# Patient Record
Sex: MALE | Race: WHITE | Employment: OTHER | URBAN - METROPOLITAN AREA
[De-identification: names, ages, dates, MRNs, and addresses within clinical notes are randomized per-mention and may not be internally consistent; named-entity substitution may affect disease eponyms.]

---

## 2018-05-01 ENCOUNTER — HOSPITAL ENCOUNTER (EMERGENCY)
Facility: CLINIC | Age: 78
Discharge: HOME | End: 2018-05-01
Attending: EMERGENCY MEDICINE
Payer: MEDICARE

## 2018-05-01 VITALS
HEART RATE: 61 BPM | SYSTOLIC BLOOD PRESSURE: 150 MMHG | WEIGHT: 160 LBS | TEMPERATURE: 97.5 F | OXYGEN SATURATION: 98 % | RESPIRATION RATE: 18 BRPM | DIASTOLIC BLOOD PRESSURE: 67 MMHG | BODY MASS INDEX: 25.11 KG/M2 | HEIGHT: 67 IN

## 2018-05-01 DIAGNOSIS — S61.259A DOG BITE OF FINGER, INITIAL ENCOUNTER: ICD-10-CM
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
## 2018-05-01 DIAGNOSIS — S81.851A DOG BITE OF RIGHT LOWER LEG, INITIAL ENCOUNTER: Primary | ICD-10-CM
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
## 2018-05-01 PROCEDURE — 12002 RPR S/N/AX/GEN/TRNK2.6-7.5CM: CPT

## 2018-05-01 PROCEDURE — 90715 TDAP VACCINE 7 YRS/> IM: CPT | Performed by: EMERGENCY MEDICINE

## 2018-05-01 PROCEDURE — 6360000002 HC RX W HCPCS: Performed by: EMERGENCY MEDICINE

## 2018-05-01 PROCEDURE — 99283 EMERGENCY DEPT VISIT LOW MDM: CPT

## 2018-05-01 PROCEDURE — 90471 IMMUNIZATION ADMIN: CPT | Performed by: EMERGENCY MEDICINE

## 2018-05-01 PROCEDURE — 6370000000 HC RX 637 (ALT 250 FOR IP): Performed by: EMERGENCY MEDICINE

## 2018-05-01 PROCEDURE — 2500000003 HC RX 250 WO HCPCS: Performed by: EMERGENCY MEDICINE

## 2018-05-01 RX ORDER — AMOXICILLIN AND CLAVULANATE POTASSIUM 875; 125 MG/1; MG/1
1 TABLET, FILM COATED ORAL 2 TIMES DAILY
Qty: 20 TABLET | Refills: 0 | Status: SHIPPED | OUTPATIENT
Start: 2018-05-01 | End: 2018-05-11

## 2018-05-01 RX ORDER — LIDOCAINE HYDROCHLORIDE 10 MG/ML
20 INJECTION, SOLUTION EPIDURAL; INFILTRATION; INTRACAUDAL; PERINEURAL ONCE
Status: COMPLETED | OUTPATIENT
Start: 2018-05-01 | End: 2018-05-01

## 2018-05-01 RX ORDER — LIDOCAINE HYDROCHLORIDE 10 MG/ML
10 INJECTION, SOLUTION EPIDURAL; INFILTRATION; INTRACAUDAL; PERINEURAL ONCE
Status: DISCONTINUED | OUTPATIENT
Start: 2018-05-01 | End: 2018-05-01

## 2018-05-01 RX ORDER — LIDOCAINE HYDROCHLORIDE 10 MG/ML
INJECTION, SOLUTION INFILTRATION; PERINEURAL
Status: DISCONTINUED
Start: 2018-05-01 | End: 2018-05-01 | Stop reason: HOSPADM

## 2018-05-01 RX ORDER — GINSENG 100 MG
CAPSULE ORAL ONCE
Status: COMPLETED | OUTPATIENT
Start: 2018-05-01 | End: 2018-05-01

## 2018-05-01 RX ORDER — GABAPENTIN 100 MG/1
100 CAPSULE ORAL 2 TIMES DAILY
COMMUNITY

## 2018-05-01 RX ADMIN — TETANUS TOXOID, REDUCED DIPHTHERIA TOXOID AND ACELLULAR PERTUSSIS VACCINE, ADSORBED 0.5 ML: 5; 2.5; 8; 8; 2.5 SUSPENSION INTRAMUSCULAR at 12:04

## 2018-05-01 RX ADMIN — BACITRACIN: 500 OINTMENT TOPICAL at 13:20

## 2018-05-01 RX ADMIN — LIDOCAINE HYDROCHLORIDE 20 ML: 10 INJECTION, SOLUTION EPIDURAL; INFILTRATION; INTRACAUDAL; PERINEURAL at 12:51

## 2018-05-01 ASSESSMENT — PAIN SCALES - GENERAL: PAINLEVEL_OUTOF10: 5

## 2018-05-01 ASSESSMENT — PAIN DESCRIPTION - PAIN TYPE: TYPE: ACUTE PAIN

## 2019-08-29 PROBLEM — S81.011A LACERATION OF RIGHT KNEE: Status: ACTIVE | Noted: 2018-05-21

## 2019-08-29 PROBLEM — I65.23 BILATERAL CAROTID ARTERY STENOSIS: Status: ACTIVE | Noted: 2017-11-06

## 2019-08-29 PROBLEM — R39.15 URINARY URGENCY: Status: ACTIVE | Noted: 2018-09-26

## 2019-08-29 PROBLEM — R60.0 BILATERAL EDEMA OF LOWER EXTREMITY: Status: ACTIVE | Noted: 2017-07-18

## 2019-08-29 PROBLEM — R00.1 SINUS BRADYCARDIA: Status: ACTIVE | Noted: 2017-10-30

## 2019-08-29 PROBLEM — N40.1 BENIGN NON-NODULAR PROSTATIC HYPERPLASIA WITH LOWER URINARY TRACT SYMPTOMS: Status: ACTIVE | Noted: 2017-09-26

## 2019-08-29 PROBLEM — Z95.2 H/O AORTIC VALVE REPLACEMENT: Status: ACTIVE | Noted: 2019-08-16

## 2019-08-29 PROBLEM — T14.8XXA OTHER INJURY OF UNSPECIFIED BODY REGION, INITIAL ENCOUNTER: Status: ACTIVE | Noted: 2019-08-16

## 2019-08-29 PROBLEM — I73.9 VASCULAR DISEASE, PERIPHERAL (HCC): Status: ACTIVE | Noted: 2017-04-04

## 2019-08-29 PROBLEM — I73.9 CLAUDICATION (HCC): Status: ACTIVE | Noted: 2017-11-06

## 2019-09-04 ENCOUNTER — OFFICE VISIT (OUTPATIENT)
Dept: INFECTIOUS DISEASES | Age: 79
End: 2019-09-04
Payer: MEDICARE

## 2019-09-04 VITALS
RESPIRATION RATE: 16 BRPM | HEART RATE: 75 BPM | WEIGHT: 148.4 LBS | HEIGHT: 67 IN | OXYGEN SATURATION: 97 % | SYSTOLIC BLOOD PRESSURE: 130 MMHG | TEMPERATURE: 97.6 F | DIASTOLIC BLOOD PRESSURE: 74 MMHG | BODY MASS INDEX: 23.29 KG/M2

## 2019-09-04 DIAGNOSIS — L08.9 INFECTION OF HEMATOMA OF WOUND: Primary | ICD-10-CM

## 2019-09-04 DIAGNOSIS — T14.8XXA INFECTION OF HEMATOMA OF WOUND: Primary | ICD-10-CM

## 2019-09-04 PROCEDURE — 1123F ACP DISCUSS/DSCN MKR DOCD: CPT | Performed by: INTERNAL MEDICINE

## 2019-09-04 PROCEDURE — 1036F TOBACCO NON-USER: CPT | Performed by: INTERNAL MEDICINE

## 2019-09-04 PROCEDURE — G8420 CALC BMI NORM PARAMETERS: HCPCS | Performed by: INTERNAL MEDICINE

## 2019-09-04 PROCEDURE — 4040F PNEUMOC VAC/ADMIN/RCVD: CPT | Performed by: INTERNAL MEDICINE

## 2019-09-04 PROCEDURE — 99213 OFFICE O/P EST LOW 20 MIN: CPT | Performed by: INTERNAL MEDICINE

## 2019-09-04 PROCEDURE — G8427 DOCREV CUR MEDS BY ELIG CLIN: HCPCS | Performed by: INTERNAL MEDICINE

## 2019-09-04 PROCEDURE — G8598 ASA/ANTIPLAT THER USED: HCPCS | Performed by: INTERNAL MEDICINE

## 2019-09-05 NOTE — PROGRESS NOTES
[facility]    Follow-up NOTE           Visit date: 9/4/2019      Reason for visit /chief complaints   Infected hematoma    Assessment:      Diagnosis Orders   1. Infection of hematoma of wound         · Infected hematoma right lower extremity  ·             Status post I and D 08/18/2019  · History of aortic valve replacement  · Coronary artery disease  · GERD  · Hypertension      Plan:     · Wound culture positive for Pseudomonas and MSSA  Tissue culture positive for staph, pseudomonas, enterococcus and ALCALIGENES   Completed Augmentin and Levaquin  Continue local wound care    advised to continue to follow with the vascular      HPI:  Patient is 67-year-old male came in for follow-up after discharge from [facility].  He was diagnosed with infected right lower extremity hematoma status post IND in August and was treated with IV antibiotic initially and was given Augmentin Levaquin to complete antibiotic course afterward which he did. No fever and chills no vomiting or diarrhea no cough or shortness of breath.         Past Medical History:   Diagnosis Date    Aortic valve stenosis 9/26/2016    Benign hypertensive heart disease without congestive heart failure 12/16/2013    Benign non-nodular prostatic hyperplasia with lower urinary tract symptoms 9/26/2017    Bilateral carotid artery stenosis 11/6/2017    Bilateral edema of lower extremity 7/18/2017    Carotid atherosclerosis 7/31/2012    Claudication ([address] 11/6/2017    Coronary atherosclerosis of native coronary artery 7/11/2016    Diverticulosis of large intestine 7/25/2016    Gastroesophageal reflux disease 7/25/2016    H/O aortic valve replacement 8/16/2019    Hyperlipidemia     Hypertension     Laceration of right knee 5/21/2018    Mitral stenosis and aortic insufficiency 8/31/2006    Mitral valve disease 6/20/2006    Multinodular goiter 7/25/2016    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

## 2020-03-26 ENCOUNTER — HOSPITAL ENCOUNTER (EMERGENCY)
Facility: CLINIC | Age: 80
Discharge: HOME | End: 2020-03-27
Attending: EMERGENCY MEDICINE
Payer: MEDICARE

## 2020-03-26 PROCEDURE — 99282 EMERGENCY DEPT VISIT SF MDM: CPT

## 2020-03-26 PROCEDURE — 30901 CONTROL OF NOSEBLEED: CPT

## 2020-03-27 VITALS
OXYGEN SATURATION: 97 % | HEART RATE: 77 BPM | DIASTOLIC BLOOD PRESSURE: 72 MMHG | SYSTOLIC BLOOD PRESSURE: 163 MMHG | HEIGHT: 67 IN | BODY MASS INDEX: 23.07 KG/M2 | WEIGHT: 147 LBS | TEMPERATURE: 98.2 F | RESPIRATION RATE: 14 BRPM

## 2020-03-27 RX ORDER — CEPHALEXIN 500 MG/1
500 CAPSULE ORAL 2 TIMES DAILY
Qty: 6 CAPSULE | Refills: 0 | Status: SHIPPED | OUTPATIENT
Start: 2020-03-27 | End: 2020-03-30

## 2020-03-27 NOTE — ED NOTES
5.5 cm nasal packing placed per Dr. Raul Gray . Pt tolerated procedure without difficulty.       Narcisa Brantley RN  03/27/20 0515

## 2020-04-11 ENCOUNTER — HOSPITAL ENCOUNTER (EMERGENCY)
Facility: CLINIC | Age: 80
Discharge: HOME | End: 2020-04-12
Attending: EMERGENCY MEDICINE
Payer: MEDICARE

## 2020-04-11 VITALS
BODY MASS INDEX: 23.54 KG/M2 | OXYGEN SATURATION: 98 % | SYSTOLIC BLOOD PRESSURE: 147 MMHG | HEIGHT: 67 IN | HEART RATE: 64 BPM | TEMPERATURE: 97.9 F | RESPIRATION RATE: 18 BRPM | WEIGHT: 150 LBS | DIASTOLIC BLOOD PRESSURE: 63 MMHG

## 2020-04-11 PROCEDURE — 30901 CONTROL OF NOSEBLEED: CPT

## 2020-04-11 PROCEDURE — 99282 EMERGENCY DEPT VISIT SF MDM: CPT

## 2020-04-11 PROCEDURE — 6370000000 HC RX 637 (ALT 250 FOR IP): Performed by: EMERGENCY MEDICINE

## 2020-04-11 RX ORDER — DOXYCYCLINE HYCLATE 50 MG/1
324 CAPSULE, GELATIN COATED ORAL
COMMUNITY

## 2020-04-11 RX ADMIN — Medication 1 EACH: at 23:34

## 2020-04-12 ASSESSMENT — ENCOUNTER SYMPTOMS
VOMITING: 0
SORE THROAT: 0
ABDOMINAL PAIN: 0
DIARRHEA: 0
NAUSEA: 0
SHORTNESS OF BREATH: 0

## 2020-04-12 NOTE — ED PROVIDER NOTES
of wrist, Other injury of unspecified body region, initial encounter, Presence of prosthetic heart valve, Restless legs syndrome, Sinus bradycardia, Urinary urgency, and Vascular disease, peripheral ([address] SURGICAL HISTORY      has a past surgical history that includes Coronary artery bypass graft (2006) and Aortic valve replacement. CURRENT MEDICATIONS       Previous Medications    ASPIRIN 81 MG CHEWABLE TABLET    Take 81 mg by mouth daily    ATORVASTATIN (LIPITOR) 40 MG TABLET    Take 40 mg by mouth daily    CLOPIDOGREL (PLAVIX) 75 MG TABLET    Take 75 mg by mouth daily    FENOFIBRATE (TRICOR) 48 MG TABLET    Take 48 mg by mouth daily    FERROUS GLUCONATE (FERGON) 324 (38 FE) MG TABLET    Take 324 mg by mouth daily (with breakfast)    GABAPENTIN (NEURONTIN) 100 MG CAPSULE    Take 100 mg by mouth 2 times daily. Lizbeth Ruiz LOSARTAN (COZAAR) 100 MG TABLET    Take 100 mg by mouth daily    METOPROLOL (LOPRESSOR) 25 MG TABLET    Take 25 mg by mouth 2 times daily    PANTOPRAZOLE (PROTONIX) 40 MG TABLET    Take 40 mg by mouth daily    PRAMIPEXOLE (MIRAPEX) 1.5 MG TABLET    Take 1.5 mg by mouth 3 times daily       ALLERGIES     has No Known Allergies. FAMILY HISTORY     has no family status information on file. family history is not on file. SOCIAL HISTORY      reports that he has quit smoking. He has never used smokeless tobacco. He reports current alcohol use. He reports that he does not use drugs. PHYSICAL EXAM     INITIAL VITALS:  height is 5' 7\" (1.702 m) and weight is 68 kg (150 lb). His oral temperature is 97.9 °F (36.6 °C). His blood pressure is 147/63 (abnormal) and his pulse is 64. His respiration is 18 and oxygen saturation is 98%. Lifestyle   Physical activity    Days per week: Not on file    Minutes per session: Not on file     Physical Exam   Constitutional: He appears well-developed and well-nourished. No distress. HENT:   Head: Normocephalic and atraumatic.    Right Ear: External ear stopped with the nasal clamp that he has. Advised to also attempt following up with ENT as soon as practical.  Patient is comfortable with the plan. Also advised to use bacitracin ointment gently over the cauterized area for the next week. I have reviewed the disposition diagnosis with the patient and or their family/guardian. I have answered their questions and given discharge instructions. They voiced understanding of these instructions and did not have any further questions or complaints.       CONSULTS:    None    CRITICAL CARE:     None    PROCEDURES:    None    FINAL IMPRESSION      1. Epistaxis          DISPOSITION/PLAN   DISPOSITION        Condition on Disposition    Improved    PATIENT REFERRED TO:  Jael Florez MD  [facility] [address]  [address]    Schedule an appointment as soon as possible for a visit in 3 days        DISCHARGE MEDICATIONS:  New Prescriptions    No medications on file       (Please note that portions of this note were completed with a voice recognition program.  Efforts were made to edit the dictations but occasionally words are mis-transcribed.)    Asad Ford DO  Attending Emergency Physician       Asad Ford DO  04/12/20 0007

## 2020-04-12 NOTE — ED NOTES
Pt arrived at ED with c/o a nose bleed from the right nares that started about 2 hours ago. Sts he had the same thing happen about 3 weeks ago and had to have packing put in. Skin warm and dry. Rolled up paper towel removed from the right nares which was blood soaked. Nasal clamp put on the nose. Active bleeding stopped. Respirations non-labored.      Vanessa Moses RN  04/11/20 9958 Cigarettes

## 2020-06-06 ENCOUNTER — HOSPITAL ENCOUNTER (EMERGENCY)
Facility: CLINIC | Age: 80
Discharge: HOME | End: 2020-06-07
Attending: EMERGENCY MEDICINE
Payer: MEDICARE

## 2020-06-06 VITALS
RESPIRATION RATE: 18 BRPM | TEMPERATURE: 98.2 F | OXYGEN SATURATION: 98 % | DIASTOLIC BLOOD PRESSURE: 69 MMHG | SYSTOLIC BLOOD PRESSURE: 164 MMHG | BODY MASS INDEX: 23.86 KG/M2 | HEIGHT: 67 IN | HEART RATE: 82 BPM | WEIGHT: 152 LBS

## 2020-06-06 PROCEDURE — 30901 CONTROL OF NOSEBLEED: CPT

## 2020-06-06 PROCEDURE — 6370000000 HC RX 637 (ALT 250 FOR IP): Performed by: EMERGENCY MEDICINE

## 2020-06-06 PROCEDURE — 99283 EMERGENCY DEPT VISIT LOW MDM: CPT

## 2020-06-06 RX ADMIN — Medication 1 EACH: at 23:40

## 2020-06-07 NOTE — ED PROVIDER NOTES
Respiratory: Clear to auscultation bilaterally no wheezes, rhonchi, rales, no respiratory distress no tachypnea no retractions no hypoxia  Gastrointestinal: Soft, nontender, nondistended, positive bowel sounds. No rebound, rigidity, or guarding. Musculoskeletal: No extremity pain or swelling   Neurologic: Moving all 4 extremities without difficulty there are no gross focal neurologic deficits   Skin: Warm and dry     DIFFERENTIAL DIAGNOSIS/ MDM:     Epistaxis right nostril. Will attempt silver nitrate. DIAGNOSTIC RESULTS     EKG: All E[facility]'s [facility] who either signs or Co-signs this chart in the absence of a cardiologist.        Not indicated unless otherwise documented above    LABS:  No results found for this visit on 06/06/20. Not indicated unless otherwise documented above    RADIOLOGY:   I reviewed the radiologist interpretations:    No orders to display       Not indicated unless otherwise documented above    EMERGENCY DEPARTMENT COURSE:     The patient was given the following medications:  Orders Placed This Encounter   Medications    silver nitrate applicators applicator 1 each        Vitals:   -------------------------  BP (!) 164/69 Comment: JIMMY  Pulse 82   Temp 98.2 °F (36.8 °C) (Oral)   Resp 18   Ht 5' 7\" (1.702 m)   Wt 68.9 kg (152 lb)   SpO2 98%   BMI 23.81 kg/m²     12:05 AM reevaluated bleeding still slight but improved. Repeat application of silver nitrate will reevaluate    12:15 AM no active bleeding. Follow-up and return if worse. I have reviewed the disposition diagnosis with the patient and or their family/guardian. I have answered their questions and given discharge instructions. They voiced understanding of these instructions and did not have any furtherquestions or complaints.     CRITICAL CARE:    None    CONSULTS:    None    PROCEDURES:    Reason reclining position using silver nitrate to cauterize chemically the right side of the septum.        OARRS Report if indicated             FINAL IMPRESSION      1. Epistaxis          DISPOSITION/PLAN   DISPOSITION Decision To Discharge 06/06/2020 11:52:34 PM        CONDITION ON DISPOSITION: STABLE       PATIENT REFERRED TO:  Javi Valdez, [address]  [facility] [address] [facility]    Schedule an appointment as soon as possible for a visit in 1 week        DISCHARGE MEDICATIONS:  New Prescriptions    No medications on file       (Please note that portions of thisnote were completed with a voice recognition program.  Efforts were made to edit the dictations but occasionally words are mis-transcribed.)    Lani Severin,, DO  Attending Emergency Physician        Lani Severin,   06/07/20 0021

## 2020-07-02 ENCOUNTER — APPOINTMENT (OUTPATIENT)
Dept: GENERAL RADIOLOGY | Facility: CLINIC | Age: 80
End: 2020-07-02
Payer: MEDICARE

## 2020-07-02 ENCOUNTER — HOSPITAL ENCOUNTER (EMERGENCY)
Facility: CLINIC | Age: 80
Discharge: ANOTHER HOSPITAL | End: 2020-07-03
Attending: EMERGENCY MEDICINE
Payer: MEDICARE

## 2020-07-02 ENCOUNTER — APPOINTMENT (OUTPATIENT)
Dept: ULTRASOUND IMAGING | Facility: CLINIC | Age: 80
End: 2020-07-02
Payer: MEDICARE

## 2020-07-02 LAB
ABSOLUTE EOS #: 0 K/UL (ref 0–0.4)
ABSOLUTE IMMATURE GRANULOCYTE: ABNORMAL K/UL (ref 0–0.3)
ABSOLUTE LYMPH #: 0.36 K/UL (ref 1–4.8)
ABSOLUTE MONO #: 0.28 K/UL (ref 0.1–0.8)
ANION GAP SERPL CALCULATED.3IONS-SCNC: 10 MMOL/L (ref 9–17)
BASOPHILS # BLD: 0 % (ref 0–2)
BASOPHILS ABSOLUTE: 0 K/UL (ref 0–0.2)
BUN BLDV-MCNC: 24 MG/DL (ref 8–23)
BUN/CREAT BLD: ABNORMAL (ref 9–20)
CALCIUM SERPL-MCNC: 9.1 MG/DL (ref 8.6–10.4)
CHLORIDE BLD-SCNC: 108 MMOL/L (ref 98–107)
CO2: 23 MMOL/L (ref 20–31)
CREAT SERPL-MCNC: 1.3 MG/DL (ref 0.7–1.2)
DIFFERENTIAL TYPE: ABNORMAL
EOSINOPHILS RELATIVE PERCENT: 0 % (ref 1–4)
GFR AFRICAN AMERICAN: >60 ML/MIN
GFR NON-AFRICAN AMERICAN: 53 ML/MIN
GFR SERPL CREATININE-BSD FRML MDRD: ABNORMAL ML/MIN/{1.73_M2}
GFR SERPL CREATININE-BSD FRML MDRD: ABNORMAL ML/MIN/{1.73_M2}
GLUCOSE BLD-MCNC: 120 MG/DL (ref 70–99)
HCT VFR BLD CALC: 22.9 % (ref 41–53)
HEMOGLOBIN: 7.5 G/DL (ref 13.5–17.5)
IMMATURE GRANULOCYTES: ABNORMAL %
INR BLD: 1.1
LACTIC ACID, WHOLE BLOOD: NORMAL MMOL/L (ref 0.7–2.1)
LACTIC ACID: 1.1 MMOL/L (ref 0.5–2.2)
LYMPHOCYTES # BLD: 5 % (ref 24–44)
MCH RBC QN AUTO: 32.5 PG (ref 26–34)
MCHC RBC AUTO-ENTMCNC: 32.9 G/DL (ref 31–37)
MCV RBC AUTO: 98.6 FL (ref 80–100)
MONOCYTES # BLD: 4 % (ref 1–7)
MORPHOLOGY: ABNORMAL
MORPHOLOGY: ABNORMAL
NRBC AUTOMATED: ABNORMAL PER 100 WBC
PARTIAL THROMBOPLASTIN TIME: 26.5 SEC (ref 21.3–31.3)
PDW BLD-RTO: 20 % (ref 12.5–15.4)
PLATELET # BLD: 220 K/UL (ref 140–450)
PLATELET ESTIMATE: ABNORMAL
PMV BLD AUTO: 8.5 FL (ref 6–12)
POTASSIUM SERPL-SCNC: 3.7 MMOL/L (ref 3.7–5.3)
PROTHROMBIN TIME: 10.9 SEC (ref 9.4–12.6)
RBC # BLD: 2.32 M/UL (ref 4.5–5.9)
RBC # BLD: ABNORMAL 10*6/UL
SEG NEUTROPHILS: 91 % (ref 36–66)
SEGMENTED NEUTROPHILS ABSOLUTE COUNT: 6.46 K/UL (ref 1.8–7.7)
SODIUM BLD-SCNC: 141 MMOL/L (ref 135–144)
TROPONIN INTERP: ABNORMAL
TROPONIN T: ABNORMAL NG/ML
TROPONIN, HIGH SENSITIVITY: 25 NG/L (ref 0–22)
WBC # BLD: 7.1 K/UL (ref 3.5–11)
WBC # BLD: ABNORMAL 10*3/UL

## 2020-07-02 PROCEDURE — 96365 THER/PROPH/DIAG IV INF INIT: CPT

## 2020-07-02 PROCEDURE — 87205 SMEAR GRAM STAIN: CPT

## 2020-07-02 PROCEDURE — 80048 BASIC METABOLIC PNL TOTAL CA: CPT

## 2020-07-02 PROCEDURE — 87150 DNA/RNA AMPLIFIED PROBE: CPT

## 2020-07-02 PROCEDURE — 96375 TX/PRO/DX INJ NEW DRUG ADDON: CPT

## 2020-07-02 PROCEDURE — 87040 BLOOD CULTURE FOR BACTERIA: CPT

## 2020-07-02 PROCEDURE — 99285 EMERGENCY DEPT VISIT HI MDM: CPT

## 2020-07-02 PROCEDURE — 2580000003 HC RX 258: Performed by: EMERGENCY MEDICINE

## 2020-07-02 PROCEDURE — 85025 COMPLETE CBC W/AUTO DIFF WBC: CPT

## 2020-07-02 PROCEDURE — 87077 CULTURE AEROBIC IDENTIFY: CPT

## 2020-07-02 PROCEDURE — 36415 COLL VENOUS BLD VENIPUNCTURE: CPT

## 2020-07-02 PROCEDURE — 87186 SC STD MICRODIL/AGAR DIL: CPT

## 2020-07-02 PROCEDURE — 76857 US EXAM PELVIC LIMITED: CPT

## 2020-07-02 PROCEDURE — 85730 THROMBOPLASTIN TIME PARTIAL: CPT

## 2020-07-02 PROCEDURE — 93005 ELECTROCARDIOGRAM TRACING: CPT

## 2020-07-02 PROCEDURE — 83605 ASSAY OF LACTIC ACID: CPT

## 2020-07-02 PROCEDURE — 85610 PROTHROMBIN TIME: CPT

## 2020-07-02 PROCEDURE — 84484 ASSAY OF TROPONIN QUANT: CPT

## 2020-07-02 PROCEDURE — 87070 CULTURE OTHR SPECIMN AEROBIC: CPT

## 2020-07-02 PROCEDURE — 71045 X-RAY EXAM CHEST 1 VIEW: CPT

## 2020-07-02 PROCEDURE — 6360000002 HC RX W HCPCS: Performed by: EMERGENCY MEDICINE

## 2020-07-02 RX ORDER — MORPHINE SULFATE 2 MG/ML
INJECTION, SOLUTION INTRAMUSCULAR; INTRAVENOUS
Status: DISCONTINUED
Start: 2020-07-02 | End: 2020-07-03 | Stop reason: HOSPADM

## 2020-07-02 RX ORDER — 0.9 % SODIUM CHLORIDE 0.9 %
1000 INTRAVENOUS SOLUTION INTRAVENOUS ONCE
Status: COMPLETED | OUTPATIENT
Start: 2020-07-02 | End: 2020-07-02

## 2020-07-02 RX ORDER — MORPHINE SULFATE 2 MG/ML
2 INJECTION, SOLUTION INTRAMUSCULAR; INTRAVENOUS ONCE
Status: COMPLETED | OUTPATIENT
Start: 2020-07-03 | End: 2020-07-02

## 2020-07-02 RX ADMIN — MORPHINE SULFATE 2 MG: 2 INJECTION, SOLUTION INTRAMUSCULAR; INTRAVENOUS at 23:58

## 2020-07-02 RX ADMIN — DEXTROSE MONOHYDRATE 1 G: 50 INJECTION, SOLUTION INTRAVENOUS at 21:12

## 2020-07-02 RX ADMIN — SODIUM CHLORIDE 1000 ML: 9 INJECTION, SOLUTION INTRAVENOUS at 20:15

## 2020-07-02 ASSESSMENT — PAIN DESCRIPTION - FREQUENCY: FREQUENCY: INTERMITTENT

## 2020-07-02 ASSESSMENT — PAIN SCALES - GENERAL
PAINLEVEL_OUTOF10: 9
PAINLEVEL_OUTOF10: 7

## 2020-07-02 ASSESSMENT — PAIN DESCRIPTION - PAIN TYPE: TYPE: ACUTE PAIN

## 2020-07-02 ASSESSMENT — PAIN DESCRIPTION - LOCATION: LOCATION: GROIN

## 2020-07-02 ASSESSMENT — PAIN DESCRIPTION - DESCRIPTORS: DESCRIPTORS: SHARP

## 2020-07-02 ASSESSMENT — PAIN DESCRIPTION - ORIENTATION: ORIENTATION: RIGHT

## 2020-07-03 VITALS
HEIGHT: 67 IN | BODY MASS INDEX: 23.39 KG/M2 | WEIGHT: 149 LBS | SYSTOLIC BLOOD PRESSURE: 70 MMHG | HEART RATE: 137 BPM | OXYGEN SATURATION: 99 % | DIASTOLIC BLOOD PRESSURE: 50 MMHG | TEMPERATURE: 98.2 F | RESPIRATION RATE: 28 BRPM

## 2020-07-03 LAB
HCT VFR BLD CALC: 23.3 % (ref 41–53)
HEMOGLOBIN: 7.3 G/DL (ref 13.5–17.5)

## 2020-07-03 PROCEDURE — 85014 HEMATOCRIT: CPT

## 2020-07-03 PROCEDURE — 85018 HEMOGLOBIN: CPT

## 2020-07-03 PROCEDURE — 6360000002 HC RX W HCPCS: Performed by: EMERGENCY MEDICINE

## 2020-07-03 PROCEDURE — 96376 TX/PRO/DX INJ SAME DRUG ADON: CPT

## 2020-07-03 PROCEDURE — 96375 TX/PRO/DX INJ NEW DRUG ADDON: CPT

## 2020-07-03 RX ORDER — FENTANYL CITRATE 50 UG/ML
25 INJECTION, SOLUTION INTRAMUSCULAR; INTRAVENOUS ONCE
Status: COMPLETED | OUTPATIENT
Start: 2020-07-03 | End: 2020-07-03

## 2020-07-03 RX ORDER — ALBUTEROL SULFATE 2.5 MG/3ML
2.5 SOLUTION RESPIRATORY (INHALATION) ONCE
Status: COMPLETED | OUTPATIENT
Start: 2020-07-03 | End: 2020-07-03

## 2020-07-03 RX ADMIN — ALBUTEROL SULFATE 2.5 MG: 2.5 SOLUTION RESPIRATORY (INHALATION) at 01:56

## 2020-07-03 RX ADMIN — FENTANYL CITRATE 25 MCG: 50 INJECTION, SOLUTION INTRAMUSCULAR; INTRAVENOUS at 03:37

## 2020-07-03 ASSESSMENT — PAIN SCALES - GENERAL
PAINLEVEL_OUTOF10: 10
PAINLEVEL_OUTOF10: 10

## 2020-07-03 ASSESSMENT — PAIN DESCRIPTION - LOCATION: LOCATION: GROIN

## 2020-07-03 ASSESSMENT — PAIN DESCRIPTION - PAIN TYPE: TYPE: ACUTE PAIN

## 2020-07-03 ASSESSMENT — PAIN DESCRIPTION - ORIENTATION: ORIENTATION: RIGHT

## 2020-07-03 ASSESSMENT — PAIN DESCRIPTION - DESCRIPTORS: DESCRIPTORS: SHARP

## 2020-07-03 ASSESSMENT — PAIN DESCRIPTION - FREQUENCY: FREQUENCY: CONTINUOUS

## 2020-07-03 NOTE — ED NOTES
Pt presents to the ED via private auto accompanied by his wife. Pt states he wasn't feeling well today and this afternoon he felt very weak and almost faint-like. Pt also stated he felt like he had a fever but no temperature was taken. Pt took Tylenol 30 min prior to arrival. Pt also noted that he had surgery on his right leg on this past June 2 and has been healing well.       Leesa Hyman RN  07/02/20 2005

## 2020-07-03 NOTE — ED NOTES
Ultrasound technician arrived for testng     [facility]  07/02/20 2282

## 2020-07-03 NOTE — ED NOTES
TXA placed on gauze and placed on open wound right groin.  Dr. Donavon Montgomery at bedside applying pressure to right groin, pt tolerated well     Yun Bain RN  07/03/20 Löbegabbie Mix  07/03/20 3481

## 2020-07-03 NOTE — ED NOTES
Pt was placed in Trendelenburg position. Dr. Arturo Valdez at bedside.  B/P -70/50, R - 32, HR - 1497 Jose Beaulieu, RN  07/03/20 Andrea. Trace Felipe RN  07/03/20 1896

## 2020-07-03 NOTE — ED PROVIDER NOTES
[facility]  [address] [facility]  Phone: [phone]      Pt Name: Margarito Bragg  BPH:[id number]  [place] 1940  Date of evaluation: 7/2/2020      CHIEF COMPLAINT       Chief Complaint   Patient presents with    Fever     x1 day,  unknown temp    Fatigue       HISTORY OF PRESENT ILLNESS   71-year-old male with a history of peripheral vascular disease presents to the emergency department today after he developed a fever this afternoon. He does report overwhelming associated fatigue. He was outside working on his deck when the symptoms started. He is recovering from vascular surgery of his right lower extremity and was seen by his vascular surgeon earlier today. Wounds were assessed and he appears to be healing nicely according to the patient. He denies any cough. No nasal congestion. No chest pain. No nausea or vomiting. No abdominal pain or diarrhea. He does not know or have any idea where the source of the fever is. No known sick contacts. REVIEW OF SYSTEMS     Review of Systems   All other systems reviewed and are negative.         PAST MEDICAL HISTORY    has a past medical history of Aortic valve stenosis, Benign hypertensive heart disease without congestive heart failure, Benign non-nodular prostatic hyperplasia with lower urinary tract symptoms, Bilateral carotid artery stenosis, Bilateral edema of lower extremity, Carotid atherosclerosis, Claudication ([address] Coronary atherosclerosis of native coronary artery, Diverticulosis of large intestine, Gastroesophageal reflux disease, H/O aortic valve replacement, Hyperlipidemia, Hypertension, Laceration of right knee, Mitral stenosis and aortic insufficiency, Mitral valve disease, Multinodular goiter, Osteoarthritis of wrist, Other injury of unspecified body region, initial encounter, Presence of prosthetic heart valve, Restless legs syndrome, Sinus bradycardia, Urinary urgency, and Vascular disease, peripheral ([facility] SURGICAL HISTORY      has a past surgical history that includes Coronary artery bypass graft (2006) and Aortic valve replacement. CURRENT MEDICATIONS       Current Discharge Medication List      CONTINUE these medications which have NOT CHANGED    Details   ferrous gluconate (FERGON) 324 (38 Fe) MG tablet Take 324 mg by mouth daily (with breakfast)      gabapentin (NEURONTIN) 100 MG capsule Take 100 mg by mouth 2 times daily. .      metoprolol (LOPRESSOR) 25 MG tablet Take 25 mg by mouth 2 times daily      pantoprazole (PROTONIX) 40 MG tablet Take 40 mg by mouth daily      fenofibrate (TRICOR) 48 MG tablet Take 48 mg by mouth daily      atorvastatin (LIPITOR) 40 MG tablet Take 40 mg by mouth daily      pramipexole (MIRAPEX) 1.5 MG tablet Take 1.5 mg by mouth 3 times daily      aspirin 81 MG chewable tablet Take 81 mg by mouth daily      losartan (COZAAR) 100 MG tablet Take 100 mg by mouth daily      clopidogrel (PLAVIX) 75 MG tablet Take 75 mg by mouth daily             ALLERGIES     has No Known Allergies. FAMILY HISTORY     has no family status information on file. family history is not on file. SOCIAL HISTORY      reports that he has quit smoking. He has never used smokeless tobacco. He reports current alcohol use. He reports that he does not use drugs. PHYSICAL EXAM     INITIAL VITALS:  height is 5' 7\" (1.702 m) and weight is 67.6 kg (149 lb). His oral temperature is 98.2 °F (36.8 °C). His blood pressure is 70/50 (abnormal) and his pulse is 123. His respiration is 32 (abnormal) and oxygen saturation is 99%. Physical Exam  Vitals signs reviewed. Constitutional:       Appearance: He is well-developed. HENT:      Head: Normocephalic and atraumatic. Eyes:      Conjunctiva/sclera: Conjunctivae normal.      Pupils: Pupils are equal, round, and reactive to light. Neck:      Musculoskeletal: Normal range of motion and neck supple. Trachea: No tracheal deviation.    Cardiovascular: Rate and Rhythm: Regular rhythm. Tachycardia present. Pulmonary:      Effort: Pulmonary effort is normal.      Breath sounds: Normal breath sounds. Abdominal:      General: Bowel sounds are normal. There is no distension. Palpations: Abdomen is soft. Tenderness: There is no abdominal tenderness. Musculoskeletal: Normal range of motion. General: Swelling present. No tenderness. Comments: There is bilateral lower extremity distal half pretibial pitting edema. There is a dressing on this patient's right calf presumably secondary to his wound. In his right inguinal area there is a stapled incision with the lower half dehisced. There is bloody drainage. Skin is indurated around this area. Skin:     General: Skin is warm and dry. Findings: No rash. Neurological:      Mental Status: He is alert and oriented to person, place, and time. Psychiatric:         Behavior: Behavior normal.         Thought Content: Thought content normal.         Judgment: Judgment normal.           DIFFERENTIAL DIAGNOSIS/ MDM:   I have initiated a septic work-up on this patient. Wife reports that it has been draining serosanguineous material from his right groin but today when she change the dressing the discharge was green and foul-smelling. DIAGNOSTIC RESULTS     EKG:  EKG is interpreted by myself as showing a sinus tachycardia with a rate of 103. He has abnormal R wave progression to the anterior leads but no acute ST segment changes. RADIOLOGY:   Xr Chest Portable    Result Date: 7/2/2020  EXAMINATION: ONE XRAY VIEW OF THE CHEST 7/2/2020 8:19 pm COMPARISON: 04/17/2016 HISTORY: ORDERING SYSTEM PROVIDED HISTORY: fever unknown sorce TECHNOLOGIST PROVIDED HISTORY: fever unknown sorce Reason for Exam: Pt c/o fever of unknown origin and fatigue today. No trauma. Hx of aortic valve replacement Acuity: Acute Type of Exam: Initial FINDINGS: Heart is borderline enlarged. The lungs are clear.   No adenopathy or pleural effusion. Sternal wires and clips from prior CABG noted. No acute abnormality. Borderline cardiomegaly. Clear lungs.          LABS:  Results for orders placed or performed during the hospital encounter of 07/02/20   CBC Auto Differential   Result Value Ref Range    WBC 7.1 3.5 - 11.0 k/uL    RBC 2.32 (L) 4.5 - 5.9 m/uL    Hemoglobin 7.5 (L) 13.5 - 17.5 g/dL    Hematocrit 22.9 (L) 41 - 53 %    MCV 98.6 80 - 100 fL    MCH 32.5 26 - 34 pg    MCHC 32.9 31 - 37 g/dL    RDW 20.0 (H) 12.5 - 15.4 %    Platelets 425 876 - 744 k/uL    MPV 8.5 6.0 - 12.0 fL    NRBC Automated NOT REPORTED per 100 WBC    Differential Type NOT REPORTED     Immature Granulocytes NOT REPORTED 0 %    Absolute Immature Granulocyte NOT REPORTED 0.00 - 0.30 k/uL    WBC Morphology NOT REPORTED     RBC Morphology NOT REPORTED     Platelet Estimate NOT REPORTED     Seg Neutrophils 91 (H) 36 - 66 %    Lymphocytes 5 (L) 24 - 44 %    Monocytes 4 1 - 7 %    Eosinophils % 0 (L) 1 - 4 %    Basophils 0 0 - 2 %    Segs Absolute 6.46 1.8 - 7.7 k/uL    Absolute Lymph # 0.36 (L) 1.0 - 4.8 k/uL    Absolute Mono # 0.28 0.1 - 0.8 k/uL    Absolute Eos # 0.00 0.0 - 0.4 k/uL    Basophils Absolute 0.00 0.0 - 0.2 k/uL    Morphology ANISOCYTOSIS PRESENT     Morphology HYPOCHROMIA PRESENT    Basic Metabolic Panel w/ Reflex to MG   Result Value Ref Range    Glucose 120 (H) 70 - 99 mg/dL    BUN 24 (H) 8 - 23 mg/dL    CREATININE 1.30 (H) 0.70 - 1.20 mg/dL    Bun/Cre Ratio NOT REPORTED 9 - 20    Calcium 9.1 8.6 - 10.4 mg/dL    Sodium 141 135 - 144 mmol/L    Potassium 3.7 3.7 - 5.3 mmol/L    Chloride 108 (H) 98 - 107 mmol/L    CO2 23 20 - 31 mmol/L    Anion Gap 10 9 - 17 mmol/L    GFR Non-African American 53 (L) >60 mL/min    GFR African American >60 >60 mL/min    GFR Comment          GFR Staging NOT REPORTED    Troponin   Result Value Ref Range    Troponin, High Sensitivity 25 (H) 0 - 22 ng/L    Troponin T NOT REPORTED <0.03 ng/mL    Troponin Interp family members confirm that he has a history of restless leg syndrome. As soon as I get him up and ambulate him he feels considerably better and the pain is gone. Currently patient is called out because he is tremulous in his upper extremities. He is afebrile. Lungs are clear but he seems to have a little bit of an expiratory wheeze, seems to be more upper airway. I have written for an albuterol aerosol. 3:30 AM EDT  Patient having resting comfortably up until recently when he called out. He was very agitated. He looked pale. He is sitting in a massive amount of blood. His dressing which was just checked approximately 15 minutes prior to him calling out according to the nurse is now saturated. It was not saturated previously. I have applied direct pressure to the wound. He has been placed in Trendelenburg. He has been hydrated with IV fluids. I do feel that he requires more emergent transfer to a tertiary care facility. I have removed the dressing and at this point now after direct pressure there is only minimal oozing. I have reapplied TXA and reapplied the sandbags. He was briefly hypotensive during this event and has been resuscitated nicely with IV fluids although he still remains tachycardic with a pulse in the 120s. I have updated this patient's son. I have discussed this patient Dr. Isidro Chavira, ER attending at [facility] who is kind enough to accept this patient in transfer. Empirically I would say that this patient does require blood transfusion currently however we do not have any blood products here. Repeat hemoglobin is now 7.2. I presume that it is considerably lower than that. CONSULTS:  None    CRITICAL CARE:   Critical Care: The high probability of sudden, clinically significant deterioration in the patient's condition required the highest level of my preparedness to intervene urgently. ?   The services I provided to this patient were to treat and/or prevent clinically significant deterioration. Services included the following: chart data review, reviewing nursing notes and/or old charts, documentation time, consultant collaboration regarding findings and treatment options, medication orders and management, direct patient care, vital sign assessments and ordering, interpreting and reviewing diagnostic studies/lab tests. ?  Aggregate critical care time includes only time during which I was engaged in work directly related to the patient's care, as described above, whether at the bedside or elsewhere in the Emergency Department. It did not include time spent performing other reported procedures or the services of residents, students, nurses, nurse practitioners or physician assistants. ?  Critical Care: 35 minutes    PROCEDURES:  None    FINAL IMPRESSION      1. Cellulitis of right lower extremity       Hemorrhage from right groin    DISPOSITION/PLAN   DISPOSITION Decision To Transfer 07/02/2020 10:54:27 PM      Condition on Disposition  guarded    PATIENT REFERRED TO:  No follow-up provider specified.     DISCHARGE MEDICATIONS:  [unfilled]    (Please note that portions of this note were completed with a voice recognitionprogram.  Efforts were made to edit the dictations but occasionally words are mis-transcribed.)    Aracelis Briseno MD, F.A.C.E.P, F.A.A.E.M  Emergency Physician Attending          Aracelis Briseno MD  07/02/20 8738 Francisco Beaulieu MD  07/03/20 2678

## 2020-07-03 NOTE — ED NOTES
Respirations reg and non-labored, expiratory wheezes continue however pt denies any difficulty breathing. Pt does however c/o right groin pain. . [facility] notified.      Loida Ramirez RN  07/03/20 [address] Caryl Foote RN  07/03/20 5337

## 2020-07-03 NOTE — ED NOTES
[facility] informed that there is an 8 hour wait for beds for admission     Birdie Villafuerte RN  07/02/20 3503

## 2020-07-03 NOTE — ED NOTES
Vital sign that were charted that were charted  at 0316 were set at the wrong time that was suppose to be [address], [address] [facility]  07/03/20 2345

## 2020-07-03 NOTE — ED NOTES
Blood drawn my Diego Shelling from IV saline lock.  Pt taken out of trendelenburg and laid flat       Ingris Munguia RN  07/03/20 [address] [facility] [place] Fontenot  07/03/20 5508

## 2020-07-03 NOTE — ED NOTES
#1 Blood culture drawn with blood work and sent to lab     Kwame Harris, [facility]  07/02/20 2020

## 2020-07-03 NOTE — ED NOTES
Orthostatic BP   Lyin 94% 18 118/54   Sittin 95% 16 111/62   Standin  95%  16  100/62    Pt denied any complaints.      Linden Kwon RN  20       Linden Kwon RN  20

## 2020-07-03 NOTE — ED NOTES
Assisted pt back to bed. Repositioned for comfort. Pt c/o right groin pain rated 5/10. Right groin dressing has small amount bright red drainage.       Jeff Agrawal RN  07/03/20 2466

## 2020-07-03 NOTE — ED NOTES
Blood draw attempted by Henrique Stuart RN on the left antecube without success, vein infiltrated.      Ector Linares RN  07/03/20 9364

## 2020-07-04 LAB
CULTURE: ABNORMAL
Lab: ABNORMAL
SPECIMEN DESCRIPTION: ABNORMAL

## 2020-07-05 LAB
CULTURE: ABNORMAL
DIRECT EXAM: ABNORMAL
DIRECT EXAM: ABNORMAL
Lab: ABNORMAL
Lab: ABNORMAL
SPECIMEN DESCRIPTION: ABNORMAL
SPECIMEN DESCRIPTION: ABNORMAL

## 2020-07-06 LAB
EKG ATRIAL RATE: 104 BPM
EKG P AXIS: 81 DEGREES
EKG P-R INTERVAL: 152 MS
EKG Q-T INTERVAL: 334 MS
EKG QRS DURATION: 86 MS
EKG QTC CALCULATION (BAZETT): 439 MS
EKG R AXIS: 54 DEGREES
EKG T AXIS: 90 DEGREES
EKG VENTRICULAR RATE: 104 BPM

## 2020-08-19 ENCOUNTER — HOSPITAL ENCOUNTER (EMERGENCY)
Facility: CLINIC | Age: 80
Discharge: HOME | End: 2020-08-19
Attending: EMERGENCY MEDICINE
Payer: MEDICARE

## 2020-08-19 VITALS
RESPIRATION RATE: 18 BRPM | BODY MASS INDEX: 19.11 KG/M2 | OXYGEN SATURATION: 100 % | WEIGHT: 122 LBS | TEMPERATURE: 97.9 F | DIASTOLIC BLOOD PRESSURE: 76 MMHG | SYSTOLIC BLOOD PRESSURE: 132 MMHG | HEART RATE: 94 BPM

## 2020-08-19 PROCEDURE — 6370000000 HC RX 637 (ALT 250 FOR IP): Performed by: EMERGENCY MEDICINE

## 2020-08-19 PROCEDURE — 99282 EMERGENCY DEPT VISIT SF MDM: CPT

## 2020-08-19 PROCEDURE — 30901 CONTROL OF NOSEBLEED: CPT

## 2020-08-19 PROCEDURE — 2500000003 HC RX 250 WO HCPCS: Performed by: EMERGENCY MEDICINE

## 2020-08-19 RX ADMIN — PHENYLEPHRINE HYDROCHLORIDE 1 SPRAY: 0.5 SPRAY NASAL at 10:36

## 2020-08-19 RX ADMIN — Medication 1 EACH: at 10:36

## 2020-08-19 NOTE — ED PROVIDER NOTES
[address] [place] [address] ED  15 [facility]  Phone: [facility] ED  EMERGENCY DEPARTMENT ENCOUNTER      Pt Name: Miroslava Fritz  MRN: [id number]  [place] 1940  Date of evaluation: 8/19/2020  Provider: Farida Warren DO    CHIEF COMPLAINT       Chief Complaint   Patient presents with    Epistaxis     complaining of epistaxis onset 3 hours ago, he states that he is on Plavix, sent by visiting nurse for evaluation          HISTORY OF PRESENT ILLNESS   (Location/Symptom, Timing/Onset,Context/Setting, Quality, Duration, Modifying Factors, Severity)  Note limiting factors. Miroslava Fritz is a 67 y.o. male who presents to the emergency department for the evaluation of nosebleed. Patient does have history of recurrent nosebleed, he is on Plavix. Patient states that he woke up this morning and was having some intermittent bleeding and a home health nurse that came into the house recommended he come to the ER to get checked out. He was able to have the bleeding stopped prior to his arrival here and he has a tissue in his nose. He has no lightheadedness or dizziness. Denies any injury to his nose. Nursing Notes were reviewed. REVIEW OF SYSTEMS    (2-9systems for level 4, 10 or more for level 5)     Review of Systems   HENT: Positive for nosebleeds. Neurological: Negative for weakness. Except asnoted above the remainder of the review of systems was reviewed and negative.        PAST MEDICAL HISTORY     Past Medical History:   Diagnosis Date    Aortic valve stenosis 9/26/2016    Benign hypertensive heart disease without congestive heart failure 12/16/2013    Benign non-nodular prostatic hyperplasia with lower urinary tract symptoms 9/26/2017    Bilateral carotid artery stenosis 11/6/2017    Bilateral edema of lower extremity 7/18/2017    Carotid atherosclerosis 7/31/2012    Claudication ([address] 11/6/2017    Coronary atherosclerosis of native coronary artery 7/11/2016    Diverticulosis of large intestine 7/25/2016    Gastroesophageal reflux disease 7/25/2016    H/O aortic valve replacement 8/16/2019    Hyperlipidemia     Hypertension     Laceration of right knee 5/21/2018    Mitral stenosis and aortic insufficiency 8/31/2006    Mitral valve disease 6/20/2006    Multinodular goiter 7/25/2016    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 body region, initial encounter 8/16/2019    Presence of prosthetic heart valve 9/19/2016    Restless legs syndrome 7/25/2016    Sinus bradycardia 10/30/2017    Urinary urgency 9/26/2018    Vascular disease, peripheral ([facility] [facility] 4/4/2017         SURGICAL HISTORY       Past Surgical History:   Procedure Laterality Date    ABOVE KNEE AMPUTATION Right     AORTIC VALVE REPLACEMENT      CORONARY ARTERY BYPASS GRAFT  2006         CURRENT MEDICATIONS     Previous Medications    ASPIRIN 81 MG CHEWABLE TABLET    Take 81 mg by mouth daily    ATORVASTATIN (LIPITOR) 40 MG TABLET    Take 40 mg by mouth daily    CLOPIDOGREL (PLAVIX) 75 MG TABLET    Take 75 mg by mouth daily    FENOFIBRATE (TRICOR) 48 MG TABLET    Take 48 mg by mouth daily    FERROUS GLUCONATE (FERGON) 324 (38 FE) MG TABLET    Take 324 mg by mouth daily (with breakfast)    GABAPENTIN (NEURONTIN) 100 MG CAPSULE    Take 100 mg by mouth 2 times daily. Heddy  LOSARTAN (COZAAR) 100 MG TABLET    Take 100 mg by mouth daily    METOPROLOL (LOPRESSOR) 25 MG TABLET    Take 25 mg by mouth 2 times daily    PANTOPRAZOLE (PROTONIX) 40 MG TABLET    Take 40 mg by mouth daily    PRAMIPEXOLE (MIRAPEX) 1.5 MG TABLET    Take 1.5 mg by mouth 3 times daily       ALLERGIES     Patient has no known allergies. FAMILY HISTORY     History reviewed. No pertinent family history.        SOCIAL HISTORY       Social History     Socioeconomic History    Marital status:      Spouse name: None    Number of children: None    Years of education: None    Highest education inpatient management. They have remained hemodynamically stable and are stable for discharge with outpatient follow-up. Standard anticipatory guidance given to patient upon discharge. Have given them a specific time frame in which to follow-up and who to follow-up with. I have also advised them that they should return to the emergency department if they get worse, or not getting better or develop any new or concerning symptoms.   Patient demonstrates understanding.    [TS]      ED Course User Index  [TS] Irina Ortiz DO         PROCEDURES:  Unless otherwise noted below, none     Procedures    FINAL IMPRESSION      1. Epistaxis          DISPOSITION/PLAN   DISPOSITION Decision To Discharge 08/19/2020 10:54:51 AM      PATIENT REFERRED TO:  Claudeen Cowman, [address]  [place] [phone]    In 3 days        DISCHARGE MEDICATIONS:  New Prescriptions    No medications on file          (Please note that portions of this note were completed with a voice recognition program.  Efforts were made to edit the dictations but occasionally words are mis-transcribed.)    Irina Ortiz DO (electronically signed)  Board Certified Emergency Physician          Irina Ortiz DO  08/19/20 30 [facility]  08/19/20 1997

## 2020-09-02 ENCOUNTER — HOSPITAL ENCOUNTER (EMERGENCY)
Facility: CLINIC | Age: 80
Discharge: HOME | End: 2020-09-02
Attending: EMERGENCY MEDICINE
Payer: MEDICARE

## 2020-09-02 VITALS
HEART RATE: 92 BPM | TEMPERATURE: 98.3 F | DIASTOLIC BLOOD PRESSURE: 71 MMHG | RESPIRATION RATE: 18 BRPM | OXYGEN SATURATION: 100 % | SYSTOLIC BLOOD PRESSURE: 183 MMHG

## 2020-09-02 PROCEDURE — 99283 EMERGENCY DEPT VISIT LOW MDM: CPT

## 2020-09-02 PROCEDURE — 30903 CONTROL OF NOSEBLEED: CPT

## 2020-09-02 RX ORDER — AZITHROMYCIN 250 MG/1
500 TABLET, FILM COATED ORAL ONCE
Status: DISCONTINUED | OUTPATIENT
Start: 2020-09-02 | End: 2020-09-02 | Stop reason: HOSPADM

## 2020-09-02 RX ORDER — AZITHROMYCIN 250 MG/1
TABLET, FILM COATED ORAL
Qty: 1 PACKET | Refills: 0 | Status: SHIPPED | OUTPATIENT
Start: 2020-09-02 | End: 2020-09-12

## 2020-09-02 NOTE — ED PROVIDER NOTES
this visit on 09/02/20. Not indicated unless otherwise documented above    RADIOLOGY:   I reviewed the radiologist interpretations:    No orders to display       Not indicated unless otherwise documented above    EMERGENCY DEPARTMENT COURSE:     The patient was given the following medications:  Orders Placed This Encounter   Medications    azithromycin (ZITHROMAX) tablet 500 mg    azithromycin (ZITHROMAX) 250 MG tablet     Sig: Take 2 tablets (500 mg) on Day 1, followed by 1 tablet (250 mg) once daily on Days 2 through 5. Dispense:  1 packet     Refill:  0        Vitals:   -------------------------  BP (!) 183/71   Pulse 92   Temp 98.3 °F (36.8 °C) (Oral)   Resp 18   SpO2 100%     5:20 PM bleeding despite nasal clamp will place a packing. 5:40 PM hemostasis achieved. No blood in the posterior pharynx. Packing removal in 3 days may return to this department going into the holiday weekend. Placed on antibiotics. Follow-up and return if worsening symptoms in the meantime or any other concerns. I have reviewed the disposition diagnosis with the patient and or their family/guardian. I have answered their questions and given discharge instructions. They voiced understanding of these instructions and did not have any furtherquestions or complaints. CRITICAL CARE:    None    CONSULTS:    None    PROCEDURES:    5.5 cm Rhino Rocket placed in the right nostril and advanced up underneath the anterior aspect of the nose. Inflated to 7 mL of air. Tolerated without difficulty.       OARRS Report if indicated             FINAL IMPRESSION      1. Epistaxis          DISPOSITION/PLAN   DISPOSITION Discharge - Pending Orders Complete 09/02/2020 05:45:18 PM        CONDITION ON DISPOSITION: STABLE       PATIENT REFERRED TO:  [facility] ED  [address] [place] [address],[facility] [address]  [phone]  Go in 3 days        DISCHARGE MEDICATIONS:  New Prescriptions    AZITHROMYCIN (ZITHROMAX) 250 MG TABLET    Take 2 tablets (500 mg) on Day 1, followed by 1 tablet (250 mg) once daily on Days 2 through 5.        (Please note that portions of thisnote were completed with a voice recognition program.  Efforts were made to edit the dictations but occasionally words are mis-transcribed.)    Juani Shirley,   Attending Emergency Physician        Juani Shirley DO  09/02/20 2379

## 2020-11-03 PROBLEM — I73.9 VASCULAR DISEASE, PERIPHERAL (HCC): Status: RESOLVED | Noted: 2017-04-04 | Resolved: 2020-11-03

## 2022-02-09 ENCOUNTER — HOSPITAL ENCOUNTER (EMERGENCY)
Facility: CLINIC | Age: 82
Discharge: HOME | End: 2022-02-09
Attending: EMERGENCY MEDICINE
Payer: MEDICARE

## 2022-02-09 VITALS
RESPIRATION RATE: 16 BRPM | HEIGHT: 67 IN | DIASTOLIC BLOOD PRESSURE: 66 MMHG | OXYGEN SATURATION: 99 % | HEART RATE: 76 BPM | BODY MASS INDEX: 20.4 KG/M2 | WEIGHT: 130 LBS | SYSTOLIC BLOOD PRESSURE: 158 MMHG | TEMPERATURE: 98.6 F

## 2022-02-09 DIAGNOSIS — R04.0 EPISTAXIS: Primary | ICD-10-CM

## 2022-02-09 PROCEDURE — 99284 EMERGENCY DEPT VISIT MOD MDM: CPT

## 2022-02-09 PROCEDURE — 2500000003 HC RX 250 WO HCPCS: Performed by: EMERGENCY MEDICINE

## 2022-02-09 RX ADMIN — PHENYLEPHRINE HYDROCHLORIDE 1 SPRAY: 0.5 SPRAY NASAL at 10:27

## 2022-02-09 NOTE — ED PROVIDER NOTES
[facility] ED  15 [facility]  Phone: [facility]  EMERGENCY DEPARTMENT ENCOUNTER      Pt Name: Kinsey Yoon  MRN: [id number]  [place] 1940  Date of evaluation: 2/9/2022  Provider: Rosy Perez DO    CHIEF COMPLAINT       Chief Complaint   Patient presents with    Epistaxis         HISTORY OF PRESENT ILLNESS   (Location/Symptom, Timing/Onset,Context/Setting, Quality, Duration, Modifying Factors, Severity)  Note limiting factors. Kinsey Yoon is a [de-identified] y.o. male who presents to the emergency department for the evaluation of a nosebleed. Patient states he is on Plavix and he states he does get recurring nosebleeds especially in the winter. Normally get them to resolve within an hour but this 1 did not. Denies trauma. Denies any other acute complaints, pain, abnormality or problem    Nursing Notes were reviewed. REVIEW OF SYSTEMS    (2-9systems for level 4, 10 or more for level 5)     Review of Systems   Constitutional: Negative for fever. HENT: Positive for nosebleeds. Except asnoted above the remainder of the review of systems was reviewed and negative.        PAST MEDICAL HISTORY     Past Medical History:   Diagnosis Date    Aortic valve stenosis 9/26/2016    Benign hypertensive heart disease without congestive heart failure 12/16/2013    Benign non-nodular prostatic hyperplasia with lower urinary tract symptoms 9/26/2017    Bilateral carotid artery stenosis 11/6/2017    Bilateral edema of lower extremity 7/18/2017    Carotid atherosclerosis 7/31/2012    Claudication ([facility] 11/6/2017    Coronary atherosclerosis of native coronary artery 7/11/2016    Diverticulosis of large intestine 7/25/2016    Gastroesophageal reflux disease 7/25/2016    H/O aortic valve replacement 8/16/2019    Hyperlipidemia     Hypertension     Laceration of right knee 5/21/2018    Mitral stenosis and aortic insufficiency 8/31/2006    Mitral valve disease 6/20/2006    Multinodular goiter 7/25/2016    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 body region, initial encounter 8/16/2019    Presence of prosthetic heart valve 9/19/2016    Restless legs syndrome 7/25/2016    Sinus bradycardia 10/30/2017    Urinary urgency 9/26/2018    Vascular disease, peripheral ([address] 4/4/2017         SURGICAL HISTORY       Past Surgical History:   Procedure Laterality Date    ABOVE KNEE AMPUTATION Right     AORTIC VALVE REPLACEMENT      CORONARY ARTERY BYPASS GRAFT  2006         CURRENT MEDICATIONS     Previous Medications    ASPIRIN 81 MG CHEWABLE TABLET    Take 81 mg by mouth daily    ATORVASTATIN (LIPITOR) 40 MG TABLET    Take 40 mg by mouth daily    CLOPIDOGREL (PLAVIX) 75 MG TABLET    Take 75 mg by mouth daily    FENOFIBRATE (TRICOR) 48 MG TABLET    Take 48 mg by mouth daily    FERROUS GLUCONATE (FERGON) 324 (38 FE) MG TABLET    Take 324 mg by mouth daily (with breakfast)    GABAPENTIN (NEURONTIN) 100 MG CAPSULE    Take 100 mg by mouth 2 times daily. Aviva Garay LOSARTAN (COZAAR) 100 MG TABLET    Take 100 mg by mouth daily    METOPROLOL (LOPRESSOR) 25 MG TABLET    Take 25 mg by mouth 2 times daily    PANTOPRAZOLE (PROTONIX) 40 MG TABLET    Take 40 mg by mouth daily    PRAMIPEXOLE (MIRAPEX) 1.5 MG TABLET    Take 1.5 mg by mouth 3 times daily       ALLERGIES     Patient has no known allergies. FAMILY HISTORY     No family history on file.        SOCIAL HISTORY       Social History     Socioeconomic History    Marital status:      Spouse name: Not on file    Number of children: Not on file    Years of education: Not on file    Highest education level: Not on file   Occupational History    Not on file   Tobacco Use    Smoking status: Former Smoker    Smokeless tobacco: Never Used   Substance and Sexual Activity    Alcohol use: Yes     Comment: rarely    Drug use: No    Sexual activity: Never   Other Topics Concern    Not on file   Social History Narrative    Not on file     Social Determinants of Health     Financial Resource Strain:     Difficulty of Paying Living Expenses: Not on file   Food Insecurity:     Worried About Running Out of Food in the Last Year: Not on file    Marcelo of Food in the Last Year: Not on file   Transportation Needs:     Lack of Transportation (Medical): Not on file    Lack of Transportation (Non-Medical): Not on file   Physical Activity:     Days of Exercise per Week: Not on file    Minutes of Exercise per Session: Not on file   Stress:     Feeling of Stress : Not on file   Social Connections:     Frequency of Communication with Friends and Family: Not on file    Frequency of Social Gatherings with Friends and Family: Not on file    Attends Jainism Services: Not on file    Active Member of [address] [place] [address] [employer] or Organizations: Not on file    Attends Club or Organization Meetings: Not on file    Marital Status: Not on file   Intimate Partner Violence:     Fear of Current or Ex-Partner: Not on file    Emotionally Abused: Not on file    Physically Abused: Not on file    Sexually Abused: Not on file   Housing Stability:     Unable to Pay for Housing in the Last Year: Not on file    Number of [place] in the Last Year: Not on file    Unstable Housing in the Last Year: Not on file       SCREENINGS    Corie Coma Scale  Eye Opening: Spontaneous  Best Verbal Response: Oriented  Best Motor Response: Obeys commands  [place] Coma Scale Score: 15        PHYSICAL EXAM    (up to 7 for level 4, 8 or more for level 5)     ED Triage Vitals [02/09/22 1017]   BP Temp Temp Source Pulse Resp SpO2 Height Weight   (!) 164/74 98.6 °F (37 °C) Oral 86 14 98 % 5' 7\" (1.702 m) 130 lb (59 kg)       Physical Exam  Vitals and nursing note reviewed. Constitutional:       General: He is not in acute distress. Appearance: Normal appearance. He is not ill-appearing or toxic-appearing.    HENT:      Head: Normocephalic and atraumatic. Nose: Nose normal. No congestion. Comments: Clot, right nostril     Mouth/Throat:      Mouth: Mucous membranes are moist.   Eyes:      Conjunctiva/sclera: Conjunctivae normal.   Cardiovascular:      Rate and Rhythm: Normal rate and regular rhythm. Pulses: Normal pulses. Heart sounds: Normal heart sounds. No murmur heard. Pulmonary:      Effort: Pulmonary effort is normal. No respiratory distress. Breath sounds: Normal breath sounds. No wheezing. Abdominal:      General: There is no distension. Musculoskeletal:         General: No deformity or signs of injury. Normal range of motion. Skin:     General: Skin is warm and dry. Capillary Refill: Capillary refill takes less than 2 seconds. Findings: No rash. Neurological:      General: No focal deficit present. Mental Status: He is alert. Mental status is at baseline. Motor: No weakness. Comments: Speaking normally. No facial asymmetry. Moving all 4 extremities. Normal gait. EMERGENCY DEPARTMENT COURSE and DIFFERENTIAL DIAGNOSIS/MDM:   Vitals:    Vitals:    02/09/22 1017 02/09/22 1103   BP: (!) 164/74 (!) 158/66   Pulse: 86 76   Resp: 14 16   Temp: 98.6 °F (37 °C)    TempSrc: Oral    SpO2: 98% 99%   Weight: 59 kg (130 lb)    Height: 5' 7\" (1.702 m)        Patient presents to the emergency department with a complaint described above. Vital signs show hypertension, otherwise unremarkable. Physical exam reveals no obvious anterior exterior bleeding, small dry area/abrasion on the left septum without perforation. At this time will do 1 dose Afrin, clamp and re-evaluate      DIAGNOSTIC RESULTS     LABS:  Labs Reviewed - No data to display    All other labs were within normal range or not returned as of this dictation. RADIOLOGY:  No orders to display         ED Course as of 02/09/22 1121   Wed Feb 09, 2022   1120 Upon reevaluation, epistaxis has resolved.   At this time he is going to be discharged with instructions to use nasal saline, follow-up with PCP for reevaluation and further treatment    At this time the patient is without objective evidence of an acute process requiring hospitalization or inpatient management. They have remained hemodynamically stable and are stable for discharge with outpatient follow-up. Standard anticipatory guidance given to patient upon discharge. Have given them a specific time frame in which to follow-up and who to follow-up with. I have also advised them that they should return to the emergency department if they get worse, or not getting better or develop any new or concerning symptoms.   Patient demonstrates understanding. [TS]      ED Course User Index  [TS] Alla Ochoa DO         PROCEDURES:  Unless otherwise noted below, none     Procedures    FINAL IMPRESSION      1. Epistaxis          DISPOSITION/PLAN   DISPOSITION Decision To Discharge 02/09/2022 11:20:44 AM      PATIENT REFERRED TO:  Dana Royal MD  [facility] [address]  [facility] [phone]    In 1 week        DISCHARGE MEDICATIONS:  New Prescriptions    No medications on file          (Please note that portions of this note were completed with a voice recognition program.  Efforts were made to edit the dictations but occasionally words are mis-transcribed.)    Alla Ochoa DO (electronically signed)  Board Certified Emergency Physician         Alla Ochoa DO  02/09/22 1121

## 2024-02-06 NOTE — ED PROVIDER NOTES
[facility]  [facility]  Phone: [phone]        Pt Name: Francisco Javier Warren  MRN: [id number]  [place] 1940  Date of evaluation: 3/27/20      CHIEF COMPLAINT     Chief Complaint   Patient presents with    Epistaxis         HISTORY OF PRESENT ILLNESS  (Location/Symptom, Timing/Onset, Context/Setting, Quality, Duration, Modifying Factors, Severity.)    Francisco Javier Warren is a 67 y.o. male who presents with bleeding from the right side of the nose. He relates he's had to have packing about 10 years ago. He has not had any problems since then. However this started several hours ago and even with clamp he has not had any relief. He is also on Plavix. He relates his blood pressure at home was normal per EMS who were called out to his home. He decided to come in himself and he is hypertensive now. He denies any other issues. He does admit to me that he was recently on antibiotics for a cough which is now gone. REVIEW OF SYSTEMS    (2-9 systems for level 4, 10 or more for level 5)     Review of Systems   HENT: Positive for nosebleeds. All other systems reviewed and are negative.       PAST MEDICAL HISTORY    has a past medical history of Aortic valve stenosis, Benign hypertensive heart disease without congestive heart failure, Benign non-nodular prostatic hyperplasia with lower urinary tract symptoms, Bilateral carotid artery stenosis, Bilateral edema of lower extremity, Carotid atherosclerosis, Claudication ([address] Coronary atherosclerosis of native coronary artery, Diverticulosis of large intestine, Gastroesophageal reflux disease, H/O aortic valve replacement, Hyperlipidemia, Hypertension, Laceration of right knee, Mitral stenosis and aortic insufficiency, Mitral valve disease, Multinodular goiter, Osteoarthritis of wrist, Other injury of unspecified body region, initial encounter, Presence of prosthetic heart valve, Restless legs syndrome, Sinus bradycardia, Urinary urgency, and Vascular disease, peripheral ([facility] [address] SURGICAL HISTORY      has a past surgical history that includes Coronary artery bypass graft (2006) and Aortic valve replacement. CURRENTMEDICATIONS       Previous Medications    ASPIRIN 81 MG CHEWABLE TABLET    Take 81 mg by mouth daily    ATORVASTATIN (LIPITOR) 40 MG TABLET    Take 40 mg by mouth daily    CLOPIDOGREL (PLAVIX) 75 MG TABLET    Take 75 mg by mouth daily    FENOFIBRATE (TRICOR) 48 MG TABLET    Take 48 mg by mouth daily    GABAPENTIN (NEURONTIN) 100 MG CAPSULE    Take 100 mg by mouth 2 times daily. Pattison Loots LOSARTAN (COZAAR) 100 MG TABLET    Take 100 mg by mouth daily    METOPROLOL (LOPRESSOR) 25 MG TABLET    Take 25 mg by mouth 2 times daily    PANTOPRAZOLE (PROTONIX) 40 MG TABLET    Take 40 mg by mouth daily    PRAMIPEXOLE (MIRAPEX) 1.5 MG TABLET    Take 1.5 mg by mouth 3 times daily       ALLERGIES     has No Known Allergies. FAMILY HISTORY     has no family status information on file. family history is not on file. SOCIAL HISTORY      reports that he has quit smoking. He has never used smokeless tobacco. He reports current alcohol use. He reports that he does not use drugs. PHYSICAL EXAM    (up to 7 for level 4, 8 or more for level 5)   INITIAL VITALS:  height is 5' 7\" (1.702 m) and weight is 66.7 kg (147 lb). His oral temperature is 98.2 °F (36.8 °C). His blood pressure is 163/72 (abnormal) and his pulse is 77. His respiration is 14 and oxygen saturation is 97%. Physical Exam  Constitutional:       General: He is not in acute distress. Appearance: Normal appearance. He is well-developed. He is not ill-appearing, toxic-appearing or diaphoretic. HENT:      Head: Normocephalic and atraumatic. Right Ear: External ear normal.      Left Ear: External ear normal.      Nose:      Right Nostril: Epistaxis present. No foreign body, septal hematoma or occlusion. Left Nostril: No epistaxis.       Mouth/Throat:      Mouth: Mucous membranes are moist.   Eyes:      General:         Right eye: No discharge. Left eye: No discharge. Conjunctiva/sclera: Conjunctivae normal.      Pupils: Pupils are equal, round, and reactive to light. Neck:      Musculoskeletal: Normal range of motion and neck supple. Cardiovascular:      Rate and Rhythm: Normal rate and regular rhythm. Heart sounds: Normal heart sounds. No murmur. Pulmonary:      Effort: Pulmonary effort is normal. No respiratory distress. Breath sounds: Normal breath sounds. No wheezing or rales. Abdominal:      General: Bowel sounds are normal. There is no distension. Palpations: Abdomen is soft. There is no mass. Tenderness: There is no abdominal tenderness. There is no guarding or rebound. Musculoskeletal: Normal range of motion. Right lower leg: No edema. Left lower leg: No edema. Lymphadenopathy:      Cervical: No cervical adenopathy. Skin:     General: Skin is warm. Coloration: Skin is not pale. Findings: Ecchymosis present. No rash. Comments: To corner of right inner canthus   Neurological:      General: No focal deficit present. Mental Status: He is alert and oriented to person, place, and time. Motor: No abnormal muscle tone. Psychiatric:         Mood and Affect: Mood normal.         Behavior: Behavior normal.         DIFFERENTIAL DIAGNOSIS/ MDM:     We did discuss packing versus TXA. On with his Plavix and hypertension here I would like to just go ahead and pack. He is agreeable. I have answered any questions he has at this time.     DIAGNOSTIC RESULTS     EKG: All EKG's are interpreted by the Emergency Department Physician who either signs or Co-signs this chart in the absenceof a cardiologist.      None    RADIOLOGY:  Non-plain film images such as CT, Ultrasound and MRI are read by the radiologist. Plain radiographic images are visualized and the radiologist interpretations are reviewed as follows:     Interpretation per the Radiologist below, if available at the time of this note:    none    LABS:  No results found for this visit on 03/26/20. EMERGENCY DEPARTMENT COURSE:   Vitals:    Vitals:    03/26/20 2358   BP: (!) 163/72   Pulse: 77   Resp: 14   Temp: 98.2 °F (36.8 °C)   TempSrc: Oral   SpO2: 97%   Weight: 66.7 kg (147 lb)   Height: 5' 7\" (1.702 m)     -------------------------  BP: (!) 163/72, Temp: 98.2 °F (36.8 °C), Pulse: 77, Resp: 14      RE-EVALUATION:  Patient has good results with packing. No further bleeding after 15-20 minutes. He is comfortable going home. CONSULTS:  none    PROCEDURES:  None    FINAL IMPRESSION      1. Epistaxis          DISPOSITION/PLAN   DISPOSITION Decision To Discharge 03/27/2020 12:25:22 AM      CONDITION ON DISPOSITION:   stable    PATIENT REFERRED TO:  [facility], [id number]  [place]  [phone]    Call in 1 day  for packing removal or return to the ED for removal.      DISCHARGE MEDICATIONS:  New Prescriptions    CEPHALEXIN (KEFLEX) 500 MG CAPSULE    Take 1 capsule by mouth 2 times daily for 3 days       (Please note that portions of this note were completed with a voicerecognition program.  Efforts were made to edit the dictations but occasionally words are mis-transcribed.)    Umanzor MD, F.A.C.E.P.   Attending Emergency Medicine Physician        aLy Hurley MD  03/27/20 2991 Other

## 2025-08-11 ENCOUNTER — HOSPITAL ENCOUNTER (EMERGENCY)
Facility: CLINIC | Age: 85
Discharge: HOME | End: 2025-08-11
Attending: EMERGENCY MEDICINE
Payer: MEDICARE

## 2025-08-11 VITALS
WEIGHT: 132.1 LBS | TEMPERATURE: 98 F | HEIGHT: 67 IN | HEART RATE: 83 BPM | DIASTOLIC BLOOD PRESSURE: 53 MMHG | SYSTOLIC BLOOD PRESSURE: 151 MMHG | BODY MASS INDEX: 20.73 KG/M2 | OXYGEN SATURATION: 98 % | RESPIRATION RATE: 18 BRPM

## 2025-08-11 DIAGNOSIS — K14.8 HEMORRHAGE OF TONGUE: Primary | ICD-10-CM

## 2025-08-11 LAB
ANION GAP SERPL CALCULATED.3IONS-SCNC: 12 MMOL/L (ref 9–16)
BUN SERPL-MCNC: 35 MG/DL (ref 8–23)
CALCIUM SERPL-MCNC: 9.7 MG/DL (ref 8.6–10.4)
CHLORIDE SERPL-SCNC: 107 MMOL/L (ref 98–107)
CO2 SERPL-SCNC: 23 MMOL/L (ref 20–31)
CREAT SERPL-MCNC: 1.9 MG/DL (ref 0.7–1.2)
ERYTHROCYTE [DISTWIDTH] IN BLOOD BY AUTOMATED COUNT: 25 % (ref 12.5–15.4)
GFR, ESTIMATED: 35 ML/MIN/1.73M2
GLUCOSE SERPL-MCNC: 118 MG/DL (ref 74–99)
HCT VFR BLD AUTO: 29.1 % (ref 41–53)
HGB BLD-MCNC: 9.7 G/DL (ref 13.5–17.5)
INR PPP: 1
MCH RBC QN AUTO: 34.6 PG (ref 26–34)
MCHC RBC AUTO-ENTMCNC: 33.5 G/DL (ref 31–37)
MCV RBC AUTO: 103.4 FL (ref 80–100)
PARTIAL THROMBOPLASTIN TIME: 23.2 SEC (ref 21.3–31.3)
PLATELET # BLD AUTO: 290 K/UL (ref 140–450)
PMV BLD AUTO: 8.4 FL (ref 6–12)
POTASSIUM SERPL-SCNC: 4.2 MMOL/L (ref 3.7–5.3)
PROTHROMBIN TIME: 10.4 SEC (ref 9.4–12.6)
RBC # BLD AUTO: 2.82 M/UL (ref 4.5–5.9)
SODIUM SERPL-SCNC: 142 MMOL/L (ref 136–145)
WBC OTHER # BLD: 5.2 K/UL (ref 3.5–11)

## 2025-08-11 PROCEDURE — 85027 COMPLETE CBC AUTOMATED: CPT

## 2025-08-11 PROCEDURE — 80048 BASIC METABOLIC PNL TOTAL CA: CPT

## 2025-08-11 PROCEDURE — 99283 EMERGENCY DEPT VISIT LOW MDM: CPT

## 2025-08-11 PROCEDURE — 36415 COLL VENOUS BLD VENIPUNCTURE: CPT

## 2025-08-11 PROCEDURE — 85610 PROTHROMBIN TIME: CPT

## 2025-08-11 PROCEDURE — 2500000003 HC RX 250 WO HCPCS: Performed by: EMERGENCY MEDICINE

## 2025-08-11 PROCEDURE — 85730 THROMBOPLASTIN TIME PARTIAL: CPT

## 2025-08-11 RX ORDER — TRANEXAMIC ACID 100 MG/ML
1000 INJECTION, SOLUTION INTRAVENOUS ONCE
Status: COMPLETED | OUTPATIENT
Start: 2025-08-11 | End: 2025-08-11

## 2025-08-11 RX ADMIN — TRANEXAMIC ACID 1000 MG: 100 INJECTION INTRAVENOUS at 11:15

## 2025-08-11 ASSESSMENT — PAIN - FUNCTIONAL ASSESSMENT
PAIN_FUNCTIONAL_ASSESSMENT: 0-10
PAIN_FUNCTIONAL_ASSESSMENT: 0-10

## 2025-08-11 ASSESSMENT — PAIN SCALES - GENERAL: PAINLEVEL_OUTOF10: 0
